# Patient Record
Sex: FEMALE | Race: WHITE
[De-identification: names, ages, dates, MRNs, and addresses within clinical notes are randomized per-mention and may not be internally consistent; named-entity substitution may affect disease eponyms.]

---

## 2019-05-29 NOTE — HP
Past Medical History





- Admission


History of Present Illness: 





30 yo  @ 40 5/7 wks by first trimester ultrasound, [facility] 2019





Pregnancy complicated by: 





1. 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


s/p Pelvic PT


s/p bladder infusions





2. Varicella non-immune





Patient presented overnight with chief complaint of contractions, she received 

stadol/phenergan overnight and was 2 cm dilated. 


She is for scheduled postdates induction of labor today. She reports fetal 

movement, denies leakage of fluid or vaginal bleeding. She reports contractions





History Source: Patient


Limitations to Obtaining History: No Limitations





- Past Medical History


Cardiovascular: No: HTN


Pulmonary: No: Asthma


Gastrointestinal: No: GERD


Renal/: Yes: Other (IC as above)


...: 1


...Para: 0


...Term: 0


...: 0


...Spon : 0


...Induced : 0


...Multiple Gestation: 0


...LMP: 18


... Weeks Gestation by Dates: 40.5


...EDC by Dates: 19


...EDC by Sono: 19


Heme/Onc: No: Anemia





- Past Surgical History


Hx Myomectomy: No


Hx Transabdominal Cerclage: No


Additional Surgical History:  - Appendectomy.   - Breast augmentation.  

 - Cystoscopy





- Smoking History


Smoking history: Never smoked


Have you smoked in the past 12 months: No


Aproximately how many cigarettes per day: 0





- Alcohol/Substance Use


Hx Alcohol Use: No





- Social History


Usual Living Arrangement: Yes: With Spouse


History of Recent Travel: No





Home Medications





- Allergies


Allergies/Adverse Reactions: 


 Allergies











Allergy/AdvReac Type Severity Reaction Status Date / Time


 


ciprofloxacin [From Cipro] Allergy Severe Rash Verified 19 18:29


 


ciprofloxacin HCl Allergy Severe Rash Verified 19 18:29





[From Cipro]     


 


Penicillins Allergy Severe Rash Verified 19 18:29


 


adhesive tape Allergy Mild Rash Verified 19 18:29


 


hazelnut Allergy Unknown  Verified 19 18:29














- Home Medications


Home Medications: 


Ambulatory Orders





Pnv 29-1 Tablet 1 tab PO DAILY 05/15/19 











Family Disease History





- Family Disease History


Family History: Denies





Review of Systems





- Review of Systems


Constitutional: reports: No Symptoms


Neck: reports: No Symptoms


Cardiovascular: reports: No Symptoms


Respiratory: reports: No Symptoms


Gastrointestinal: reports: No Symptoms


Genitourinary: reports: No Symptoms


Musculoskeletal: reports: No Symptoms


Hematology/Lymphatic: reports: No Symptoms





Physical Exam - Maternity


Vital Signs: 


 Vital Signs











Temperature  98.1 F   19 10:00


 


Pulse Rate  70   19 10:00


 


Respiratory Rate  20   19 10:00


 


Blood Pressure  130/82   19 10:00


 


O2 Sat by Pulse Oximetry (%)      











Constitutional: Yes: Well Nourished, No Distress, Calm


Cardiovascular: Yes: Regular Rate and Rhythm


Lungs: Clear to auscultation





- Abdominal Exam/OB


Number of Fetuses: Single


Fetal Presentation: Vertex


Contractions: Yes


Regularity: Irregular


Category: I


Accelerations: Non-Uniform


Decelerations: None





- Vaginal Exam/OB


Dilatation (cm): 2


Effacement (%): 90


Amniotic Membrane Status: Ruptured


Amniotic Fluid: Yes: Clear


Fetal Station: -3





- Physical Exam


Edema: Yes


Edema: LLE: Trace, RLE: Trace


Psychiatric: Yes: Alert, Oriented





- Labs


Lab Results: 


 CBC, BMP





 19 23:50 





 19 23:50 





PNL: A positive, antibody negative; RPR NR; HIV neg; HBS Ag neg; HCV neg; HG 

[place] AA; Parvo immune; GBS neg; GCT WNL; Varicella non-immune; Sequential WNL; 

Inheritest core neg 





Hemorrhage Risk Assessment





- Risk Factors


Medium Risk Factors: Yes: None


High Risk Factors: Yes: None


Risk Score: 1


Risk Level: Medium Risk





Assessment/Plan





30 yo Early labor, for augmentation


1. Admit to L&D


2. Routine labs collected and sent


3. GBS neg


4. AROM performed, clear fluid. Will monitor contractions; if they continue to 

be infrequent, will start pitocin


5. Will offer pain medication upon request


6. Will proceed with expectant management

## 2019-05-29 NOTE — PN
Ante-Partal Exam





- Subjective


Subjective: 





Patient reports increased pressure


No other complaints


Vital Signs: 


 Vital Signs











Temperature  99.4 F   05/29/19 20:00


 


Pulse Rate  88   05/29/19 20:30


 


Respiratory Rate  18   05/29/19 20:30


 


Blood Pressure  136/75   05/29/19 20:30


 


O2 Sat by Pulse Oximetry (%)  98   05/29/19 20:30











Bleeding: No


Headache: No


Visual changes: No


Right upper quadrant pain: No





- Contractions


Contractions: Yes


Regularity: Regular


Intensity: Unaware


Fetal Monitor Mode: External





- Exam during Labor


Fetal Heart Rate: 150


Variability: Moderate


Category: I


Fetal Monitor Accelerations: Present


Fetal Monitor Decelerations: None


Exam: Vaginal


Dilatation (cm): 6


Effacement (%): 100


Amniotic Membrane Status: Ruptured


Amniotic Fluid: Clear


Fetal Presentation: Vertex


Fetal Station: -1





- Intrapartum Hemorrhage Risk


Medium Risk Factors: None


High Risk Factors: None


Risk Score: 0


Risk Level: Low Risk





- Assessment/Plan


Assessment/Plan: 





30 yo Active labor


1. Good cervical change; Pitocin at 9


Will continue to monitor


2. Category I FHT


3. GBS negative


4. Will proceed with expectant management

## 2019-05-29 NOTE — PN
Ante-Partal Exam





- Subjective


Subjective: 





Patient comfortable s/p epidural 


Vital Signs: 


 Vital Signs











Temperature  98.1 F   05/29/19 14:53


 


Pulse Rate  83   05/29/19 13:00


 


Respiratory Rate  20   05/29/19 13:00


 


Blood Pressure  136/84   05/29/19 13:00


 


O2 Sat by Pulse Oximetry (%)      











Bleeding: No


Headache: No


Visual changes: No


Right upper quadrant pain: No





- Contractions


Contractions: Yes


Regularity: Regular


Intensity: Unaware


Fetal Monitor Mode: External





- Exam during Labor


Fetal Heart Rate: 150


Fetal Heart Rate Location: Midline


Category: I


Fetal Monitor Accelerations: Present


Fetal Monitor Decelerations: None


Exam: Vaginal


Dilatation (cm): 3


Effacement (%): 90


Amniotic Membrane Status: Ruptured


Fetal Presentation: Vertex


Fetal Station: -3





- Intrapartum Hemorrhage Risk


Medium Risk Factors: None


High Risk Factors: None


Risk Score: 0


Risk Level: Low Risk





- Assessment/Plan


Assessment/Plan: 





30 yo augmentation of labor


1. s/p AROM; on Pitocin


2. GBS neg


3. Pain well controlled with epidural


4. category I FHT


5. Will continue with expectant management

## 2019-05-30 NOTE — PN
Delivery





- Delivery


Vaginal Delivery: No Problems


Type of Anesthesia: Epidural


Episiotomy/Laceration: Left Mediolateral (second degree), Periurethral Extnsion/

lac (first degree)


EBL (cc): 400





Delivery, Single Birth





- Stages of Labor


Date 2nd Stage Initiated: 19


Time 2nd Stage Initiated: 04:25


Date of Delivery: 19


Time of Delivery: 05:36


Date Placenta Delivered: 19


Time Placenta Delivered: 06:00


Placenta: Yes: Spontaneous





- Condition of Infant


Pediatrician/Neonatologist Present: Yes


Name: Yudelka Ward


Infant Gender: Male


Position: Left, OA





- Apgar


  ** 1 Minute


Apgar Total Score: 9





- [place] Feeding Plan


Initial Plan: Elected not to breastfeed exclusively throughout hospitalization





Remarks





- Remarks


Remarks: 


Patient progressed to fully dilated and at 0536 via  delivered a viable 

Male infant in INOCENCIO position, APGARs 9,9.  Weight and length unknown at this 

time. 


Head delivered spontaneously, nuchal cord noted and delivered through, followed 

by shoulders and body without difficulty. 


Infant with spontaneous cry and placed on mother's abdomen.  


Nose and mouth was bulb suctioned. 


Cord was clamped and cut. 


Perineum and vagina examined, second degree L vaginal; bilateral periurethral 

and first degree vaginal lacerations were noted and repaired in the usual 

fashion. 


Rectal exam revealed no sutures in rectum. 


Placenta was delivered spontaneously and intact. 


20 units of pitocin in 1 L IVF was given. 


All counts correct x 2. 


Mother and infant stable in LDR. 


EBL 400cc.

## 2019-05-31 NOTE — PN
Progress Note (short form)





- Note


Progress Note: 





ppd 1 s/p  


doing well, ambulating, voids ok


 CBC, BMP





 19 23:50 





 19 23:50 





 Last Vital Signs











Temp Pulse Resp BP Pulse Ox


 


 97.7 F   65   20   123/79   98 


 


 19 06:00  19 06:00  19 06:00  19 06:00  19 09:00








abdomen soft , no distension , no cva 


uterus firm, non tender 


lochia mild 


no calf tenderness 


plan ambulate , cbc

## 2019-09-04 NOTE — DS
Discharge Information    IV Discontiued Time:  NA    Amount of Fluid Infused:  NA    Discharge Criteria = When patient returns to baseline or as per MD order    Consciousness:  Pt is fully awake    Circulation:  BP +/- 20% of pre-procedure level    Respiration:  Patient is able to breathe deeply    O2 Sat:  Patient is able to maintain O2 Sat >92% on room air    Activity:  Moves 4 extremities on command    Ambulation:  Patient is able to stand and walk or stand and pivot into wheelchair    Dressing:  Clean/dry or No Dressing    Notes:   Discharge instructions and AVS given to patient    Patient meets criteria for discharge?  YES    Admitted to PCU?  No    Responsible adult present to accompany patient home?  Yes    Signature/Title:    jing reynolds RN  RN Care Coordinator  [facility] [place]   Physical Exam-GYN


Vital Signs: 


 Vital Signs











Temperature  98.6 F   19 08:38


 


Pulse Rate  69   19 08:38


 


Respiratory Rate  20   19 08:38


 


Blood Pressure  114/64   19 08:38


 


O2 Sat by Pulse Oximetry (%)  98   19 09:00











Constitutional: Yes: Well Nourished, No Distress, Calm


Eyes: Yes: WNL, Conjunctiva Clear, EOM Intact


HENT: Yes: WNL, Atraumatic, Normocephalic


Neck: Yes: WNL, Supple, Trachea Midline


Cardiovascular: Yes: WNL, Regular Rate and Rhythm


Respiratory: Yes: WNL, Regular, CTA Bilaterally


Gastrointestinal: Yes: WNL


...Rectal Exam: Yes: WNL


Renal/: Yes: WNL


Pelvis: Yes: WNL


External Genitalia: Yes: Normal


....Post Partum: Yes: Uterus firm, Uterus non-tender, Slight lochia rubra


Breast(s): Yes: WNL


Musculoskeletal: Yes: WNL


Extremities: Yes: WNL


Edema: No


Integumentary: Yes: WNL


Neurological: Yes: WNL, Alert, Oriented


...Motor Strength: WNL


Psychiatric: Yes: WNL, Alert, Oriented


Labs: 


 CBC, BMP





 19 08:35 





 19 23:50 











Delivery





- Delivery


Vaginal Delivery: No Problems


Type of Anesthesia: Epidural


Episiotomy/Laceration: Left Mediolateral, Periurethral Extnsion/lac, 1st degree

, 2nd degree


EBL (cc): 400





Delivery, Single Birth





- Stages of Labor


Date 1st Stage Initiatied: 19


Time 1st Stage Initiated: 11:00


Date 2nd Stage Initiated: 19


Time 2nd Stage Initiated: 04:25


Date of Delivery: 19


Time of Delivery: 05:36


Time Placenta Delivered: 06:00


Placenta: Yes: Spontaneous





- Condition of Infant


Pediatrician/Neonatologist Present: Yes


Name: Yudelka Ward


Infant Gender: Male


Birth Weight: 8 lb 5 oz


Position: Left, OA


Total Hours ROM (Hrs/Mins): 17 hours 55 minutes





- Apgar


  ** 1 Minute


Apgar Total Score: 9





  ** 5 Minutes


Apgar Total Score: 9





- [place] Feeding Plan


Initial Plan: Elected not to breastfeed exclusively throughout hospitalization





Discharge Summary


Reason For Visit: LABOR


Procedures: Principal: 


Hospital Course: 





no complication


Condition: Good





- Instructions


Referrals: 


Jaime Soliz MD [Staff Physician] - 





- Home Medications


Comprehensive Discharge Medication List: 


Ambulatory Orders





Pnv 29-1 Tablet 1 tab PO DAILY 05/15/19

## 2019-11-04 ENCOUNTER — HOSPITAL ENCOUNTER (OUTPATIENT)
Dept: HOSPITAL 74 - JASU-SURG | Age: 29
Discharge: HOME | End: 2019-11-04
Attending: OBSTETRICS & GYNECOLOGY
Payer: COMMERCIAL

## 2019-11-04 VITALS — TEMPERATURE: 98.1 F

## 2019-11-04 VITALS — DIASTOLIC BLOOD PRESSURE: 58 MMHG | SYSTOLIC BLOOD PRESSURE: 98 MMHG | HEART RATE: 55 BPM

## 2019-11-04 VITALS — BODY MASS INDEX: 24.3 KG/M2

## 2019-11-04 DIAGNOSIS — O03.4: Primary | ICD-10-CM

## 2019-11-04 PROCEDURE — 10D17ZZ EXTRACTION OF PRODUCTS OF CONCEPTION, RETAINED, VIA NATURAL OR ARTIFICIAL OPENING: ICD-10-PCS | Performed by: OBSTETRICS & GYNECOLOGY

## 2019-11-04 NOTE — HP
Past Medical History





- Primary Care Physician


PCP:: Jaime Soliz





- Admission


Chief Complaint: 30yo P1 with incomplete Ab.


History of Present Illness: 





Pt was diagnosed with missed Ab on 10/31/2019 at [facility] (US) 8 2/7wk. She began 

bleeding and passed some clots last night. The repeat US showed retained POC. 

The pt declined medical and/or expectant management and requested D&C. 


History Source: Patient, Medical Record


Limitations to Obtaining History: No Limitations





- Past Medical History


CNS: No: Alzheimer's, CVA, Dementia, Migraine, Multiple Sclerosis, Peripheral 

Neuropathy, Parkinson's, Seizure, Syncope, TIA, Vertigo, Other


Cardiovascular: No: AFIB, Aneurysm, Aortic Insufficiency, Aortic Stenosis, CAD, 

CHF, Deep Vein Thrombosis, HTN, Hyperlipdemia, MI, Mitral Insufficiency, Mitral 

Stenosis, Murmur, Pulmonary Hypertension, Other


Pulmonary: No: Asthma, Bronchitis, Cancer, COPD, O2 Dependent, Pneumonia, 

Previously Intubated, Pulmonary Embolus, Pulmonary Fibrosis, Sleep Apnea, Other


Gastrointestinal: No: Ascites, Cancer, Constipation, Crohn's Disease, 

Diverticulitis, Diverticulosis, Esophageal Varices, Gastritis, GERD, GI Bleed, 

Hemorrhoids, Hiatal Hernia, Inflamatory Bowel Disease, Irritable Bowel Disease, 

Pancreatitis, Peptic Ulcer Disease, Ulcerative Colitis, Other


Hepatobiliary: No: Cirrhosis, Cholelithiasis, Cholecystitis, Choledocholithiasis

, Hepatitis A, Hepatitis B, Hepatitis C, Other


Renal/: Yes: Other (IC as above)


Reproductive: No: Ectopic Pregnancy, Endometriosis, Fibroids, PID, Polycystic 

Ovary Syndrome, Postmenopausal, Other


...: 2


...Para: 1


... Weeks Gestation by Dates: 10


Heme/Onc: No: Anemia, B12 Deficiency, Bleeding Disorder, Cancer, Current 

Chemotherapy, Current Radiation Therapy, Hemochromatosis, Hypercoaguable State, 

Myeloproliferative Synd, Sickle Cell Disease, Sickle Cell Trait, 

Thrombocytopenia, Other


Infectious Disease: No: AIDS, C-Diff, Herpes Zoster, HIV, MRSA, STD's, 

Tuberculosis, VREF, Other


Psych: No: Addictions, Anxiety, Bipolar, Depression, Panic, Psychosis, 

Schizophrenia, Other


Musculoskeletal: No: Bursitis, Chronic low back pain, Hemiparesis, Hemiplegia, 

Osteoarthritis, Paraplegia, Other


Rheumatology: No: Fibromyalgia, Gout, Lupus, Rheumatoid Arthritis, Sarcoidosis, 

Vasculitis, Other


ENT: No: Allergic Rhinitis, Sinusitis, Other


Endocrine: No: Kings's Disease, Cushing's Disease, Diabetes Insipidus, 

Diabetes Mellitus, Hyperparathyroidism, Hyperthyroidism, Hypothyroidism, 

Osteopenia, SIADH, Other


Dermatology: No: Basal Cell, Cellulitis, Eczema, Melanoma, Psoriasis, Squamous 

Cell, Other





- Past Surgical History


Past Surgical History: Yes: Appendectomy


Hx Myomectomy: No


Hx Transabdominal Cerclage: No


Additional Surgical History: Breast Augmentation, cystoscopy.





- Smoking History


Smoking history: Never smoked


Have you smoked in the past 12 months: No


Aproximately how many cigarettes per day: 0





- Alcohol/Substance Use


Hx Alcohol Use: No


History of Substance Use: reports: None





- Social History


Usual Living Arrangement: Yes: With Spouse, With Child


ADL: Independent


Occupation: Teacher


History of Recent Travel: No





Home Medications





- Allergies


Allergies/Adverse Reactions: 


 Allergies











Allergy/AdvReac Type Severity Reaction Status Date / Time


 


ciprofloxacin [From Cipro] Allergy Severe Rash Verified 19 16:42


 


ciprofloxacin HCl Allergy Severe Rash Verified 19 16:42





[From Cipro]     


 


Penicillins Allergy Severe Rash Verified 19 16:42


 


adhesive tape Allergy Mild Rash Verified 19 16:42


 


hazelnut Allergy Unknown  Verified 19 16:42














- Home Medications


Home Medications: 


Ambulatory Orders





Pnv No.95/Ferrous Fum/Folic AC [Prenatal Vitamin Tablet] 1 each PO DAILY  











Family Medical History


Family History: Unremarkable





Review of Systems





- Review of Systems


Constitutional: reports: No Symptoms


Eyes: reports: No Symptoms


HENT: reports: No Symptoms


Neck: reports: No Symptoms


Cardiovascular: reports: No Symptoms


Respiratory: reports: No Symptoms


Gastrointestinal: reports: No Symptoms


Genitourinary: reports: Vaginal Bleeding


Breasts: reports: No Symptoms Reported


Musculoskeletal: reports: No Symptoms


Integumentary: reports: No Symptoms


Neurological: reports: No Symptoms


Endocrine: reports: No Symptoms


Hematology/Lymphatic: reports: No Symptoms


Psychiatric: reports: No Symptoms


Pain Intensity: 2





Physical Exam-GYN


Vital Signs: 


 Vital Signs











Temperature  97.8 F   19 15:56


 


Pulse Rate  79   19 15:56


 


Respiratory Rate  16   19 15:56


 


Blood Pressure  118/76   19 15:56


 


O2 Sat by Pulse Oximetry (%)  99   19 15:56











Constitutional: Yes: Well Nourished, No Distress, Calm


Eyes: Yes: WNL, Conjunctiva Clear


HENT: Yes: WNL, Atraumatic, Normocephalic


Neck: Yes: WNL, Supple, Trachea Midline


Cardiovascular: Yes: WNL, Regular Rate and Rhythm


Respiratory: Yes: WNL, Regular, CTA Bilaterally


Gastrointestinal: Yes: WNL, Normal Bowel Sounds, Soft


...Rectal Exam: Yes: Deferred


Renal/: Yes: WNL


Pelvis: Yes: WNL


External Genitalia: Yes: Normal


Internal Exam Deferred: No


Vaginal Exam: Yes: Bleeding


Cervix: Yes: Other (os 1 cm open)


Uterus: Yes: Anteverted


Adnexa: Normal: Left, Right


Musculoskeletal: Yes: WNL


Extremities: Yes: WNL


Edema: No


Integumentary: Yes: WNL


Neurological: Yes: WNL, Alert, Oriented


...Motor Strength: WNL


Psychiatric: Yes: WNL, Alert, Oriented





Imaging





- Results


Ultrasound: Report Reviewed





Assessment/Plan





30yo P1 with incomplete Ab. We had a long discussion about the risks, benefits, 

and alternatives of surgery. I explained the risks of infection, bleeding, 

scarring, amenorrhea, Asherman's syndrome, infertility, perforation, need for 

additional surgery to treat any complications, etc. The pt declined expectant 

management of missed ab or prostaglandin indx. She requested to proceed with 

surgery. I emphasized that all surgeries have risks and no guarantees can be 

provided.

## 2019-11-04 NOTE — OP
Operative Note





- Note:


Operative Date: 11/04/19


Pre-Operative Diagnosis: Incomplete Ab


Operation: Suction, D&C


Findings: 





Cervical os 1 cm open, active bleeding from os, POC noted on suction curettage. 

No RPOC at end of procedure.


Post-Operative Diagnosis: Same as Pre-op


Surgeon: Jaime Soliz


Anesthesiologist/CRNA: Saji Garcia


Anesthesia: General


Specimens Removed: POC


Estimated Blood Loss (mls): 30


Blood Volume Replaced (mls): 0


Fluid Volume Replaced (mls): 600


Operative Report Dictated: Yes

## 2019-11-05 NOTE — OP
DATE OF OPERATION:  2019

 

PREOPERATIVE DIAGNOSIS:  Incomplete .

 

POSTOPERATIVE DIAGNOSIS:  Incomplete .

 

PROCEDURE:  Suction dilation and curettage, surgical management of incomplete

.

 

SURGEON:  Ericka Reyna MD

 

COMPLICATIONS:  None. 

 

ANESTHESIA:  General.  

 

ANESTHESIOLOGIST:  Saji Garcia MD

 

PATHOLOGY:  Products of conception.  

 

INTRAVENOUS FLUIDS:  600 mL. 

 

ESTIMATED BLOOD LOSS:  30 mL.

 

FINDINGS:  Examination under anesthesia revealed a dilated cervical os to 1 cm,

active vaginal bleeding was noted.  Products of conception were noted on suction

curettage.  No retained products of conception were noted at the end of the

procedure.  

 

PROCEDURE DESCRIPTION:  The patient was met preoperatively.  Risks, benefits, and

alternatives of surgery were discussed in detail.  All questions were answered.  The

consent form was reviewed and discussed.  The patient verbalized understanding and

requested to proceed with the surgery.  She was brought to the OR with IV running. 

The patient was placed on the surgical table in the supine position.  The general

anesthesia was achieved without difficulty.  The patient was then placed in a dorsal

lithotomy position using adjustable Benja stirrups.  She was examined under

anesthesia.  A small anteverted uterus was noted.  The cervical os was 1 cm dilated

with active bleeding.  The patient was then prepped and draped in the usual sterile

fashion.  A timeout was conducted as per standard protocol.  A sterile speculum was

introduced inside the patient's vagina.  The cervix was grasped with polyp forceps

and an 8-mm suction curette was introduced inside the uterine cavity.  A suction

curettage was performed and all of the products of conception were removed.  Once

this was completed, a sharp curette was used gently to assure no retained products of

conception inside the uterine cavity.  Once this was done, good hemostasis was noted.

 Sponge, lap, and instrument counts were correct.  The patient was returned to supine

position.  She was transferred to recovery room in stable condition and awake.  

 

 

ERICKA REYNA M.D.

 

[id number]

DD: 2019 00:12

DT: 2019 00:46

Job #:  [id number]

## 2019-11-06 NOTE — PATH
Surgical Pathology Report



Patient Name:  CHANCE GARZA

Accession #:  [id number]

[facility]. Rec. #:  [id number]                                                        

   /Age/Gender:  1990 (Age: 29) / F

Account:  [id number]                                                          

             Location: [facility]

Taken:  2019

Received:  2019

Reported:  2019

Physicians:  Jaime Soliz M.D.

  



Specimen(s) Received

 PRODUCTS OF CONCEPTION 





Clinical History

Incomplete 







Final Diagnosis

PRODUCTS OF CONCEPTION, DILATION AND CURETTINGS:

CHORIONIC VILLI PRESENT, CONSISTENT WITH PRODUCTS OF CONCEPTION.





***Electronically Signed***

Simin Hinds M.D.





Gross Description

Received in formalin labeled "products of conception," is a 7.5 x 5.0 x 0.8 cm

aggregate of tan-brown soft tissue fragments. No definitive villous tissue or

fetal somatic tissue is identified. A representative portion is submitted in 3

cassettes.

/2019



saudi

## 2020-09-28 ENCOUNTER — HOSPITAL ENCOUNTER (INPATIENT)
Dept: HOSPITAL 74 - JLDR | Age: 30
LOS: 3 days | Discharge: HOME | End: 2020-10-01
Attending: OBSTETRICS & GYNECOLOGY | Admitting: OBSTETRICS & GYNECOLOGY
Payer: COMMERCIAL

## 2020-09-28 VITALS — BODY MASS INDEX: 28.8 KG/M2

## 2020-09-28 DIAGNOSIS — O44.13: Primary | ICD-10-CM

## 2020-09-28 DIAGNOSIS — Z3A.38: ICD-10-CM

## 2020-09-28 LAB
ANION GAP SERPL CALC-SCNC: 7 MMOL/L (ref 8–16)
BASE EXCESS BLDCOA CALC-SCNC: -4.4 MMOL/L (ref 0–2)
BASE EXCESS BLDCOA CALC-SCNC: -5 MMOL/L (ref 0–2)
BASOPHILS # BLD: 0.5 % (ref 0–2)
BUN SERPL-MCNC: 8.1 MG/DL (ref 7–18)
CALCIUM SERPL-MCNC: 8.4 MG/DL (ref 8.5–10.1)
CHLORIDE SERPL-SCNC: 107 MMOL/L (ref 98–107)
CO2 SERPL-SCNC: 25 MMOL/L (ref 21–32)
CREAT SERPL-MCNC: 0.6 MG/DL (ref 0.55–1.3)
DEPRECATED RDW RBC AUTO: 13.1 % (ref 11.6–15.6)
EOSINOPHIL # BLD: 0.1 % (ref 0–4.5)
GLUCOSE SERPL-MCNC: 77 MG/DL (ref 74–106)
HCO3 BLDCO-SCNC: 20.6 MMHG (ref 20–29)
HCO3 BLDCO-SCNC: 25.4 MMHG (ref 20–29)
HCT VFR BLD CALC: 42.4 % (ref 32.4–45.2)
HGB BLD-MCNC: 14.1 GM/DL (ref 10.7–15.3)
INR BLD: 0.89 (ref 0.83–1.09)
LYMPHOCYTES # BLD: 10.2 % (ref 8–40)
MCH RBC QN AUTO: 31.5 PG (ref 25.7–33.7)
MCHC RBC AUTO-ENTMCNC: 33.3 G/DL (ref 32–36)
MCV RBC: 94.5 FL (ref 80–96)
MONOCYTES # BLD AUTO: 3.4 % (ref 3.8–10.2)
NEUTROPHILS # BLD: 85.8 % (ref 42.8–82.8)
PCO2 BLDCO: 40.3 MMHG (ref 30–78)
PCO2 BLDCO: 66.3 MMHG (ref 30–78)
PH BLDCO: 7.2 [PH] (ref 7.14–7.44)
PH BLDCO: 7.33 [PH] (ref 7.14–7.44)
PLATELET # BLD AUTO: 229 K/MM3 (ref 134–434)
PMV BLD: 9.6 FL (ref 7.5–11.1)
POTASSIUM SERPLBLD-SCNC: 3.9 MMOL/L (ref 3.5–5.1)
PT PNL PPP: 10.5 SEC (ref 9.7–13)
RBC # BLD AUTO: 4.48 M/MM3 (ref 3.6–5.2)
SODIUM SERPL-SCNC: 139 MMOL/L (ref 136–145)
WBC # BLD AUTO: 16.6 K/MM3 (ref 4–10)

## 2020-09-28 RX ADMIN — ACETAMINOPHEN PRN MG: 10 INJECTION, SOLUTION INTRAVENOUS at 22:20

## 2020-09-28 NOTE — OP
Operative Note





- Note:


Operative Date: 20


Pre-Operative Diagnosis: pregnancy 37 weeks, placeta previa with hemorrhage


Operation: primary LST c/s


Findings: 





edematous low uterine , with mulyiple large blood vessles running up to upper 

mid body of uterus


baby girl rot , apgar 9/9 , both tubes and ovaries normal


Post-Operative Diagnosis: Same as Pre-op


Surgeon: Nicko Tobin (Dr hunter)


Anesthesiologist/CRNA: Pratik Hannon


Anesthesia: Spinal


Specimens Removed: placenta


Estimated Blood Loss (mls): 1,000


Drains & Tubes with Location: godfrey, clear urine


Drains, Volume Out (mls): 400


Blood Volume Replaced (mls): 0


Operative Report Dictated: Yes

## 2020-09-28 NOTE — HP
Past Medical History





- Primary Care Physician


PCP:: Nicko Tobin





- Admission


Chief Complaint: pregnancy 38 weeks, placeta previa with hemorrhage


History of Present Illness: 





31 yo f , 38.1 weeks c/o of vaginal bleeding after a fall this afternoo

n, has low abdominal cramps, no contraction, fhr cat 1 , moderate red vaginal 

bleeding noted, advised c/s


History Source: Patient


Limitations to Obtaining History: No Limitations





- Past Medical History


Renal/: Yes: Other (IC as above)


...: 3


...Para: 1


...Term: 1


...: 0


...Spon : 1


...Induced : 0


...Living Children: 1


...Multiple Gestation: 0


...LMP: 20


... Weeks Gestation by Dates: 38.1


...EDC by Dates: 10/11/20


...EDC by Sono: 10/14/20





- Past Surgical History


Past Surgical History: Yes: Appendectomy


Hx Myomectomy: No


Hx Transabdominal Cerclage: No





- Smoking History


Smoking history: Never smoked


Have you smoked in the past 12 months: No


Aproximately how many cigarettes per day: 0





- Alcohol/Substance Use


Hx Alcohol Use: No


History of Substance Use: reports: None





- Social History


Usual Living Arrangement: Yes: With Spouse


ADL: Independent


Occupation: Teacher


History of Recent Travel: No





Home Medications





- Allergies


Allergies/Adverse Reactions: 


                                    Allergies











Allergy/AdvReac Type Severity Reaction Status Date / Time


 


ciprofloxacin [From Cipro] Allergy Severe Rash Verified 20 18:32


 


ciprofloxacin HCl Allergy Severe Rash Verified 20 18:32





[From Cipro]     


 


Penicillins Allergy Severe Rash Verified 20 18:32


 


adhesive tape Allergy Mild Rash Verified 20 18:32


 


hazelnut Allergy Unknown  Verified 20 18:32














- Home Medications


Home Medications: 


Ambulatory Orders





Pnv No.95/Ferrous Fum/Folic AC [Prenatal Vitamin Tablet] 1 each PO DAILY 

19 











Review of Systems





- Review of Systems


Constitutional: reports: No Symptoms


Eyes: reports: No Symptoms


HENT: reports: No Symptoms


Neck: reports: No Symptoms


Cardiovascular: reports: No Symptoms


Respiratory: reports: No Symptoms


Gastrointestinal: reports: No Symptoms


Genitourinary: reports: No Symptoms


Breasts: reports: No Symptoms Reported


Musculoskeletal: reports: No Symptoms


Integumentary: reports: No Symptoms


Neurological: reports: No Symptoms


Endocrine: reports: No Symptoms


Hematology/Lymphatic: reports: No Symptoms


Psychiatric: reports: No Symptoms





Physical Exam - Maternity


Vital Signs: 


                                   Vital Signs











Temperature  98.3 F   20 18:34


 


Pulse Rate  99 H  20 18:34


 


Respiratory Rate  20 18:34


 


Blood Pressure  124/92   20 18:34


 


O2 Sat by Pulse Oximetry (%)      











Constitutional: Yes: Well Nourished, No Distress, Calm


Eyes: Yes: WNL, Conjunctiva Clear, EOM Intact


HENT: Yes: WNL, Atraumatic, Normocephalic


Neck: Yes: WNL, Supple, Trachea Midline


Cardiovascular: Yes: WNL, Regular Rate and Rhythm


Breast(s): Yes: WNL





- Abdominal Exam/OB


Fundal Height: 38


Number of Fetuses: Single


Fetal Presentation: Vertex


Contractions: No


Regularity: Irritability


Intensity: Unaware


Fetal Heart Rate Location: Midline


Category: I


Accelerations: Non-Uniform


Decelerations: None





- Vaginal Exam/OB


Vaginal Bleeding: Yes, Moderate


Speculum Exam: Yes


Amniotic Membrane Status: Intact


Fetal Presentation: Vertex/Position





- Physical Exam


Musculoskeletal: Yes: WNL


Extremities: Yes: WNL


Edema: Yes


Edema: LLE: Trace, RLE: Trace


Deep Tendon Reflex Grade: Normal +2


...Motor Strength: WNL


Psychiatric: Yes: WNL





Hemorrhage Risk Assessment





- Risk Factors


High Risk Factors: Yes: Placenta previa, low lying


Risk Score: 2


Risk Level: High Risk





Problem List





- Problems


(1) Pregnancy with 38 completed weeks gestation


Code(s): Z3A.38 - 38 WEEKS GESTATION OF PREGNANCY   





(2) Placenta previa


Code(s): O44.00 - COMPLETE PLACENTA PREVIA NOS OR WITHOUT HEMOR, UNSP TRI   


Qualifiers: 


   Trimester: third trimester   Qualified Code(s): O44.03 - Complete placenta 

previa NOS or without hemorrhage, third trimester   





(3) Hemorrhage


Code(s): R58 - HEMORRHAGE, NOT ELSEWHERE CLASSIFIED   





Assessment/Plan





admit for c/s


type and cross 


risks associated with c/s and placenta previa , hemorrhage and post op 

complication discussed

## 2020-09-29 LAB
APTT BLD: 24.7 SECONDS (ref 25.2–36.5)
BASOPHILS # BLD: 0.2 % (ref 0–2)
DEPRECATED RDW RBC AUTO: 12.8 % (ref 11.6–15.6)
EOSINOPHIL # BLD: 0.3 % (ref 0–4.5)
HCT VFR BLD CALC: 34.1 % (ref 32.4–45.2)
HGB BLD-MCNC: 11.5 GM/DL (ref 10.7–15.3)
INR BLD: 0.96 (ref 0.83–1.09)
LYMPHOCYTES # BLD: 10.9 % (ref 8–40)
MCH RBC QN AUTO: 31.9 PG (ref 25.7–33.7)
MCHC RBC AUTO-ENTMCNC: 33.9 G/DL (ref 32–36)
MCV RBC: 94.1 FL (ref 80–96)
MONOCYTES # BLD AUTO: 7.6 % (ref 3.8–10.2)
NEUTROPHILS # BLD: 81 % (ref 42.8–82.8)
PLATELET # BLD AUTO: 163 K/MM3 (ref 134–434)
PMV BLD: 9.1 FL (ref 7.5–11.1)
PT PNL PPP: 11.3 SEC (ref 9.7–13)
RBC # BLD AUTO: 3.62 M/MM3 (ref 3.6–5.2)
WBC # BLD AUTO: 14.2 K/MM3 (ref 4–10)

## 2020-09-29 RX ADMIN — IBUPROFEN PRN MG: 600 TABLET, FILM COATED ORAL at 18:42

## 2020-09-29 RX ADMIN — GENTAMICIN SULFATE SCH MLS/HR: 80 INJECTION, SOLUTION INTRAVENOUS at 17:25

## 2020-09-29 RX ADMIN — GENTAMICIN SULFATE SCH MLS/HR: 80 INJECTION, SOLUTION INTRAVENOUS at 02:30

## 2020-09-29 RX ADMIN — GENTAMICIN SULFATE SCH MLS/HR: 80 INJECTION, SOLUTION INTRAVENOUS at 10:12

## 2020-09-29 RX ADMIN — ENOXAPARIN SODIUM SCH MG: 40 INJECTION SUBCUTANEOUS at 10:12

## 2020-09-29 RX ADMIN — ACETAMINOPHEN PRN MG: 10 INJECTION, SOLUTION INTRAVENOUS at 12:23

## 2020-09-29 RX ADMIN — ACETAMINOPHEN PRN MG: 10 INJECTION, SOLUTION INTRAVENOUS at 06:06

## 2020-09-29 RX ADMIN — ACETAMINOPHEN PRN MG: 325 TABLET ORAL at 18:42

## 2020-09-29 RX ADMIN — SIMETHICONE CHEW TAB 80 MG PRN MG: 80 TABLET ORAL at 18:42

## 2020-09-29 RX ADMIN — SIMETHICONE CHEW TAB 80 MG PRN MG: 80 TABLET ORAL at 10:11

## 2020-09-29 RX ADMIN — GENTAMICIN SULFATE SCH MLS/HR: 80 INJECTION, SOLUTION INTRAVENOUS at 00:15

## 2020-09-29 NOTE — PN
Progress Note (short form)





- Note


Progress Note: 





Anesthesia postop note





31 y/o F s/p spinal/duramorph for  section


POD#1, vss, aaox3, pain well controlled, sensory motor intact distally


No anesthesia complications.

## 2020-09-29 NOTE — PN
Progress Note (short form)





- Note


Progress Note: 





pod 1 s/p c/s for placenta previa,


doing well, ambulating, voids ok, no excess vaginal bleeding 


                                    CBC, BMP





                                 09/29/20 06:45 





                                 09/28/20 21:45 





                                Last Vital Signs











Temp Pulse Resp BP Pulse Ox


 


 99.2 F   101 H  18   111/80   100 


 


 09/29/20 17:42  09/29/20 17:42  09/29/20 18:00  09/29/20 17:42  09/29/20 17:42








abdomen soft, no distension, no cva 


incision dry, clean 


lochia mild


no calf tenderness


plan ambulate , advance diet 


monitor VS 


DVT prophylaxis





Problem List





- Problems


(1) Pregnancy with 38 completed weeks gestation


Code(s): Z3A.38 - 38 WEEKS GESTATION OF PREGNANCY   





(2) Placenta previa


Code(s): O44.00 - COMPLETE PLACENTA PREVIA NOS OR WITHOUT HEMOR, UNSP TRI   


Qualifiers: 


   Trimester: third trimester   Qualified Code(s): O44.03 - Complete placenta 

previa NOS or without hemorrhage, third trimester   





(3) Hemorrhage


Code(s): R58 - HEMORRHAGE, NOT ELSEWHERE CLASSIFIED

## 2020-09-30 RX ADMIN — ENOXAPARIN SODIUM SCH MG: 40 INJECTION SUBCUTANEOUS at 10:25

## 2020-09-30 RX ADMIN — IBUPROFEN PRN MG: 600 TABLET, FILM COATED ORAL at 18:43

## 2020-09-30 RX ADMIN — ACETAMINOPHEN PRN MG: 325 TABLET ORAL at 18:44

## 2020-09-30 RX ADMIN — ACETAMINOPHEN PRN MG: 325 TABLET ORAL at 06:00

## 2020-09-30 RX ADMIN — ACETAMINOPHEN PRN MG: 325 TABLET ORAL at 22:51

## 2020-09-30 RX ADMIN — GENTAMICIN SULFATE SCH MLS/HR: 80 INJECTION, SOLUTION INTRAVENOUS at 01:43

## 2020-09-30 RX ADMIN — ACETAMINOPHEN PRN MG: 325 TABLET ORAL at 02:00

## 2020-09-30 RX ADMIN — IBUPROFEN PRN MG: 600 TABLET, FILM COATED ORAL at 10:45

## 2020-09-30 RX ADMIN — IBUPROFEN PRN MG: 600 TABLET, FILM COATED ORAL at 06:01

## 2020-09-30 RX ADMIN — ACETAMINOPHEN PRN MG: 325 TABLET ORAL at 15:09

## 2020-09-30 RX ADMIN — IBUPROFEN PRN MG: 600 TABLET, FILM COATED ORAL at 15:11

## 2020-09-30 RX ADMIN — SIMETHICONE CHEW TAB 80 MG PRN MG: 80 TABLET ORAL at 02:00

## 2020-09-30 RX ADMIN — ACETAMINOPHEN PRN MG: 325 TABLET ORAL at 10:46

## 2020-09-30 RX ADMIN — IBUPROFEN PRN MG: 600 TABLET, FILM COATED ORAL at 02:00

## 2020-09-30 RX ADMIN — SIMETHICONE CHEW TAB 80 MG PRN MG: 80 TABLET ORAL at 06:00

## 2020-09-30 RX ADMIN — SIMETHICONE CHEW TAB 80 MG PRN MG: 80 TABLET ORAL at 18:43

## 2020-09-30 RX ADMIN — IBUPROFEN PRN MG: 600 TABLET, FILM COATED ORAL at 22:52

## 2020-09-30 RX ADMIN — SIMETHICONE CHEW TAB 80 MG PRN MG: 80 TABLET ORAL at 22:52

## 2020-09-30 RX ADMIN — SIMETHICONE CHEW TAB 80 MG PRN MG: 80 TABLET ORAL at 10:46

## 2020-09-30 RX ADMIN — SIMETHICONE CHEW TAB 80 MG PRN MG: 80 TABLET ORAL at 15:11

## 2020-09-30 NOTE — PN
Post Partum Progress Note





- Subjective


Subjective: 





Patient without acute complaints. 


Reports tolerating oral intake without nausea or vomiting. 


Ambulating without dizziness. 


Denies fevers or chills.


Pain well controlled with oral pain medication.


Pumping/breast feeding without issue.


Post Partum Day: 2


Type of Delivery: Primary C/S


Vital Signs: 


                                   Vital Signs











Temperature  98.1 F   09/29/20 22:00


 


Pulse Rate  69   09/29/20 22:00


 


Respiratory Rate  20   09/29/20 23:00


 


Blood Pressure  112/71   09/29/20 22:00


 


O2 Sat by Pulse Oximetry (%)  95   09/29/20 22:00











Breast Exam: Yes: Soft


Uterus: Yes: Fundus Firm, Fundus below umbilicus, Non-tender


Incision: Yes: Sutures intact


Abdomen/GI: Yes: Abdomen soft, Passing flatus, Tolerating PO


Lochia: Yes: Rubra


Extremities: Yes: Calves non-tender


Perineum: Yes: Intact


Activity: Ambulating





- Labs


Labs: 


                                       CBC











WBC  14.2 K/mm3 (4.0-10.0)  H  09/29/20  06:45    


 


RBC  3.62 M/mm3 (3.60-5.2)   09/29/20  06:45    


 


Hgb  11.5 GM/dL (10.7-15.3)   09/29/20  06:45    


 


Hct  34.1 % (32.4-45.2)  D 09/29/20  06:45    


 


MCV  94.1 fl (80-96)   09/29/20  06:45    


 


MCH  31.9 pg (25.7-33.7)   09/29/20  06:45    


 


MCHC  33.9 g/dl (32.0-36.0)   09/29/20  06:45    


 


RDW  12.8 % (11.6-15.6)   09/29/20  06:45    


 


Plt Count  163 K/MM3 (134-434)  D 09/29/20  06:45    


 


MPV  9.1 fl (7.5-11.1)   09/29/20  06:45    


 


Absolute Neuts (auto)  11.5 K/mm3 (1.5-8.0)  H  09/29/20  06:45    


 


Neutrophils %  81.0 % (42.8-82.8)   09/29/20  06:45    


 


Lymphocytes %  10.9 % (8-40)   09/29/20  06:45    


 


Monocytes %  7.6 % (3.8-10.2)  D 09/29/20  06:45    


 


Eosinophils %  0.3 % (0-4.5)  D 09/29/20  06:45    


 


Basophils %  0.2 % (0-2.0)   09/29/20  06:45    


 


Nucleated RBC %  0 % (0-0)   09/29/20  06:45    














Assessment/Plan





  30y.o. female s/p primary LT C/S, doing well stable, afebrile.


The pt is asymptomatic for s/sxs of anemia.


Postpartum care instructions reviewed.


Postoperative care and instructions reviewed.


Continue routine postop care.


Ambulation encouraged.

## 2020-09-30 NOTE — DS
Physical Exam-GYN


Vital Signs: 


                                   Vital Signs











Temperature  98.4 F   20 10:00


 


Pulse Rate  72   20 10:00


 


Respiratory Rate  20 10:00


 


Blood Pressure  111/78   20 10:00


 


O2 Sat by Pulse Oximetry (%)  95   20 22:00











Constitutional: Yes: Well Nourished, No Distress, Calm


Eyes: Yes: WNL, Conjunctiva Clear, EOM Intact


HENT: Yes: WNL, Atraumatic, Normocephalic


Neck: Yes: WNL, Supple, Trachea Midline


Cardiovascular: Yes: WNL, Regular Rate and Rhythm


Respiratory: Yes: WNL, Regular, CTA Bilaterally


Gastrointestinal: Yes: WNL, Normal Bowel Sounds, Soft


...Rectal Exam: Yes: Deferred


Renal/: Yes: WNL


Internal Exam Deferred: Yes


....Post Partum: Yes: Uterus firm, Uterus non-tender, Slight lochia rubra


Breast(s): Yes: WNL


Musculoskeletal: Yes: WNL


Extremities: Yes: WNL


Edema: No


Integumentary: Yes: WNL


Wound/Incision: Yes: Clean/Dry, Well Approximated, Steri Strips, Open to air


Neurological: Yes: WNL, Alert, Oriented


...Motor Strength: WNL


Psychiatric: Yes: WNL, Alert, Oriented


Labs: 


                                    CBC, BMP





                                 20 06:45 





                                 20 21:45 











Delivery





- Delivery


 Section: Primary, Low Flap Transverse


Type of Anesthesia: Spinal


Episiotomy/Laceration: None


EBL (cc): 1,000





Delivery, Single Birth





- Stages of Labor


Date of Delivery: 20


Time of Delivery: 19:18


Time Placenta Delivered: 19:19


Placenta: Yes: Expressed, Manual Removal





- Condition of Infant


Pediatrician/Neonatologist Present: Yes


Name: Marilyn Prieto


Infant Gender: Male


Birth Weight: 3.147 kg


Total Hours ROM (Hrs/Mins): 1 min





- Apgar


  ** 1 Minute


Apgar Total Score: 9





  ** 5 Minutes


Apgar Total Score: 9





-  Feeding Plan


Initial Plan: Exclusive breastfeeding throughout hospitalization


Benefits of Breastfeeding Exclusively reinforced: Yes





Discharge Summary


Problems reviewed: Yes


Reason For Visit: C SECTION


Current Active Problems





Hemorrhage (Acute)


Placenta previa (Acute)


Pregnancy with 38 completed weeks gestation (Acute)








Procedures: Principal: Primary LT C/S


Hospital Course: 


Normal postop and postpartum recovery


Plan of Treatment: 


Normal postop and postpartum recovery


Goals: 


Normal postop and postpartum recovery


Condition: Good





- Instructions


Diet, Activity, Other Instructions: 





Physical activity





Resume your normal everyday activity as tolerated no heavy lifting or exercise 

until seen by your surgeon. You may walk unlimited margaret of and climb stairs. 

You may resume driving the car when you feel safe and comfortable behind the 

wheel. No sexual activity as instructed.





Wound care


If you have a bandage, leave it on, and keep dry for 48-72 hours. After that 

time discard the outer bandage. If they are tapes on the skin under the out of 

bandage leave them in place. They will peel off in the next 7 to 10 days. Do Not

Peel them off. You may shower the day after surgery. If there are tapes present 

on the skin, you may shower over them.





Diet


There are no dietary restrictions. Eat healthy, high-fiber foods. Drink 6 to 8 

glasses of liquid each day. This will assist in keeping your bowels are regular.





Pain management


You may take Tylenol or acetaminophen or Ibuprofen (for example, Motrin, Advil 

etc.) from my pain prescription medication is ordered should be taken as 

prescribed for moderate to severe pain.





Call MD for any of the following:





Severe pain not relieved by medication


Fever of 101 or higher


Excessive bleeding or drainage on dressing


Inability to urinate

















Referrals: 


Jaime Soliz MD [Staff Physician] - 1 Week


Disposition: HOME





- Home Medications


Comprehensive Discharge Medication List: 


Ambulatory Orders





Pnv No.95/Ferrous Fum/Folic AC [Prenatal Vitamin Tablet] 1 each PO DAILY 

19 


Ibuprofen 600 mg PO Q6H PRN #30 tablet 20 


Oxycodone HCl/Acetaminophen [Percocet 5-325 mg Tablet -] 1 - 2 tab PO Q6H PRN 

#20 tab MDD 8 20 








Prescription Drug Monitoring Program (I-STOP) results: I-STOP not reviewed

## 2020-10-01 VITALS — TEMPERATURE: 98.4 F | HEART RATE: 76 BPM | DIASTOLIC BLOOD PRESSURE: 72 MMHG | SYSTOLIC BLOOD PRESSURE: 126 MMHG

## 2020-10-01 LAB
BASOPHILS # BLD: 0.3 % (ref 0–2)
DEPRECATED RDW RBC AUTO: 13.6 % (ref 11.6–15.6)
EOSINOPHIL # BLD: 1.2 % (ref 0–4.5)
HCT VFR BLD CALC: 28.9 % (ref 32.4–45.2)
HGB BLD-MCNC: 10.2 GM/DL (ref 10.7–15.3)
LYMPHOCYTES # BLD: 14.7 % (ref 8–40)
MCH RBC QN AUTO: 33.4 PG (ref 25.7–33.7)
MCHC RBC AUTO-ENTMCNC: 35.2 G/DL (ref 32–36)
MCV RBC: 94.9 FL (ref 80–96)
MONOCYTES # BLD AUTO: 8.9 % (ref 3.8–10.2)
NEUTROPHILS # BLD: 74.9 % (ref 42.8–82.8)
PLATELET # BLD AUTO: 158 K/MM3 (ref 134–434)
PMV BLD: 9.1 FL (ref 7.5–11.1)
RBC # BLD AUTO: 3.05 M/MM3 (ref 3.6–5.2)
WBC # BLD AUTO: 10.5 K/MM3 (ref 4–10)

## 2020-10-01 RX ADMIN — IBUPROFEN PRN MG: 600 TABLET, FILM COATED ORAL at 03:01

## 2020-10-01 RX ADMIN — SIMETHICONE CHEW TAB 80 MG PRN MG: 80 TABLET ORAL at 03:02

## 2020-10-01 RX ADMIN — IBUPROFEN PRN MG: 600 TABLET, FILM COATED ORAL at 09:05

## 2020-10-01 RX ADMIN — ENOXAPARIN SODIUM SCH MG: 40 INJECTION SUBCUTANEOUS at 10:00

## 2020-10-01 RX ADMIN — SIMETHICONE CHEW TAB 80 MG PRN MG: 80 TABLET ORAL at 09:05

## 2020-10-01 RX ADMIN — ACETAMINOPHEN PRN MG: 325 TABLET ORAL at 09:06

## 2020-10-01 RX ADMIN — ACETAMINOPHEN PRN MG: 325 TABLET ORAL at 03:01

## 2020-10-01 NOTE — PN
Post Partum Progress Note





- Subjective


Subjective: 


Patient without acute complaints. 


Reports tolerating oral intake without nausea or vomiting. 


Ambulating without dizziness. 


Denies fevers or chills.


Pain well controlled with oral pain medication.


Breastfeeding without difficulty.


Passing flatus.





Post Partum Day: 3


Type of Delivery: Repeat C/S


Vital Signs: 


                                   Vital Signs











Temperature  98.9 F   09/30/20 22:00


 


Pulse Rate  75   09/30/20 22:00


 


Respiratory Rate  20   09/30/20 22:00


 


Blood Pressure  124/72   09/30/20 22:00


 


O2 Sat by Pulse Oximetry (%)  97   09/30/20 21:00











Breast Exam: Yes: Soft


Uterus: Yes: Fundus Firm, Fundus below umbilicus


Abdomen/GI: Yes: Abdomen soft, Passing flatus, Tolerating PO.  No: Abdominal 

Distention, Tender


Lochia: Yes: Rubra


Lochia, amount: Small


Extremities: Yes: Calves non-tender.  No: Edema





- Labs


Labs: 


                                       CBC











WBC  10.5 K/mm3 (4.0-10.0)  H  10/01/20  07:15    


 


RBC  3.05 M/mm3 (3.60-5.2)  L  10/01/20  07:15    


 


Hgb  10.2 GM/dL (10.7-15.3)  L  10/01/20  07:15    


 


Hct  28.9 % (32.4-45.2)  L D 10/01/20  07:15    


 


MCV  94.9 fl (80-96)   10/01/20  07:15    


 


MCH  33.4 pg (25.7-33.7)   10/01/20  07:15    


 


MCHC  35.2 g/dl (32.0-36.0)   10/01/20  07:15    


 


RDW  13.6 % (11.6-15.6)   10/01/20  07:15    


 


Plt Count  158 K/MM3 (134-434)   10/01/20  07:15    


 


MPV  9.1 fl (7.5-11.1)   10/01/20  07:15    


 


Absolute Neuts (auto)  7.9 K/mm3 (1.5-8.0)   10/01/20  07:15    


 


Neutrophils %  74.9 % (42.8-82.8)   10/01/20  07:15    


 


Lymphocytes %  14.7 % (8-40)  D 10/01/20  07:15    


 


Monocytes %  8.9 % (3.8-10.2)   10/01/20  07:15    


 


Eosinophils %  1.2 % (0-4.5)  D 10/01/20  07:15    


 


Basophils %  0.3 % (0-2.0)   10/01/20  07:15    


 


Nucleated RBC %  0 % (0-0)   10/01/20  07:15    














Assessment/Plan





29 yo POD # 3 s/p primary CD, afebrile, vital signs stable, mild asymtpoamtic 

anemia, doing well and stable for discharge home


1. Patient stable for discharge home today.


2. Patient encouraged to contact MD for:


- Severe pain not controlled by oral pain medication


- Fevers or chills


- Nausea or vomiting, intolerance of oral intake


- Incision redness, tenderness or discharge


3. Patient to follow up in office in 1-2 weeks for incision check, 4-6 weeks for

postpartum visit

## 2020-10-01 NOTE — OP
DATE OF OPERATION:  2020

 

PREOPERATIVE DIAGNOSIS:  Pregnancy at 37 weeks, placenta previa with hemorrhage.

 

POSTOPERATIVE DIAGNOSIS:  Pregnancy at 37 weeks, placenta previa with hemorrhage.

 

PROCEDURE:  Primary low segment transverse  section.

 

SURGEON:  Laura Tobin MD

 

FIRST ASSIST:  Jaime Soliz MD and Dr. Hoyos

 

ANESTHESIA:  Spinal.

 

ANESTHESIOLOGIST:  Praitk Hannon MD

 

ESTIMATED BLOOD LOSS:  1000 mL.

 

OPERATION:  The patient was taken to the operating room.  Under adequate spinal

anesthesia abdomen and perineum were prepped and draped.  Pfannenstiel abdominal

incision was made.  Abdominal wall was cut layer by layer until peritoneum was

exposed and incised.  Upon entering the abdominal cavity lower uterine segment was

identified and uterovesical fold of peritoneum established.  Bladder was pushed down.

 Then with the lower blade of the Keli retractor in the pelvis, a low transverse

uterine incision was made.  Incision extended laterally with bandage scissor and

placenta pushed away and the head delivered.  Nasopharynx was suctioned and a live

baby girl was delivered.  Apgar 9, 9.  Placenta was delivered manually.  Uterine

cavity was cleaned of all remaining tissue.  Uterine incision closed first with 0

Biosyn continuous suture, then the second layer with 0 Biosyn imbricating the first

layer.  There was some oozing from the right broad ligament which 2 ligature sutures

of 0 Biosyn were applied and the oozing still continued.  Dr. Soliz put a suture in

the right round ligament and hemostasis was established.  Then FloSeal was placed on

that round ligament.  No active bleeding was seen and then peritoneum was closed with

0 Biosyn continuous suture.  Muscles were brought together with interrupted suture of

0 Biosyn.  Fascia was closed with 0 Biosyn continuous suture, subcutaneous fat with

interrupted suture of 0 Vicryl and skin was closed with 3-0 Vicryl subcuticular

continuous suture.  Patient tolerated procedure well, left the OR in a good

condition.

 

 

LAURA TOBIN M.D. [id number]

DD: 2020 21:28

DT: 10/01/2020 09:22

Job #:  [id number]

## 2020-10-02 NOTE — PATH
Surgical Pathology Report



Patient Name:  CHANCE GARZA

Accession #:  [id number]

[facility]. Rec. #:  [id number]                                                        

   /Age/Gender:  1990 (Age: 30) / F

Account:  [id number]                                                          

             Location: [facility] OBS/GYN

Taken:  2020

Received:  2020

Reported:  10/2/2020

Physicians:  Nicko Tobin M.D.

  



Specimen(s) Received

 PLACENTA 





Clinical History

, 37.5 weeks placenta previa, status post fall on left side with acute

active bleeding







Final Diagnosis

PLACENTA,  SECTION:

452 G THIRD TRIMESTER PLACENTA WITH TRIVASCULAR UMBILICAL CORD AND UNREMARKABLE

PLACENTAL MEMBRANES.





***Electronically Signed***

Angelika Dodd M.D.





Gross Description

The specimen is received fresh labeled placenta and is a 452 gram, 23.5 x 15.5 x

1.6 cm. placenta with attached membranes and umbilical cord.  The attached

membranes are tan, translucent with focal opacities and insert marginally.  The

umbilical cord measures 10 cm. in length and averages 1.2 cm. in diameter.  The

cord inserts eccentrically, 3 cm. to the nearest margin.  No true knots or

strictures are identified. Cut surface of the umbilical cord reveals 3 vessels.

The fetal surface is grey-blue with minimal fibrin deposition and appropriate

caliber vessels.  The maternal surface is red-brown with focal defects. 

Sectioning reveals red-brown, spongy parenchyma.  No lesions are identified. 

Representative sections are submitted in three cassettes as follows: 1- membrane

rolls and umbilical cord; 2-3- full thickness sections of placenta.

/10/1/2020



[facility]/10/1/2020

## 2021-03-08 ENCOUNTER — HOSPITAL ENCOUNTER (EMERGENCY)
Dept: HOSPITAL 74 - JVIRT | Age: 31
Discharge: HOME | End: 2021-03-08
Payer: COMMERCIAL

## 2021-03-08 DIAGNOSIS — Z11.52: Primary | ICD-10-CM

## 2021-03-08 PROCEDURE — U0003 INFECTIOUS AGENT DETECTION BY NUCLEIC ACID (DNA OR RNA); SEVERE ACUTE RESPIRATORY SYNDROME CORONAVIRUS 2 (SARS-COV-2) (CORONAVIRUS DISEASE [COVID-19]), AMPLIFIED PROBE TECHNIQUE, MAKING USE OF HIGH THROUGHPUT TECHNOLOGIES AS DESCRIBED BY CMS-2020-01-R: HCPCS

## 2021-03-08 PROCEDURE — C9803 HOPD COVID-19 SPEC COLLECT: HCPCS

## 2022-03-15 ENCOUNTER — NON-APPOINTMENT (OUTPATIENT)
Age: 32
End: 2022-03-15

## 2022-03-16 PROBLEM — Z00.00 ENCOUNTER FOR PREVENTIVE HEALTH EXAMINATION: Status: ACTIVE | Noted: 2022-03-16

## 2022-03-17 ENCOUNTER — APPOINTMENT (OUTPATIENT)
Dept: INTERNAL MEDICINE | Facility: CLINIC | Age: 32
End: 2022-03-17
Payer: COMMERCIAL

## 2022-03-17 VITALS
OXYGEN SATURATION: 96 % | WEIGHT: 150 LBS | BODY MASS INDEX: 23.54 KG/M2 | HEART RATE: 72 BPM | SYSTOLIC BLOOD PRESSURE: 108 MMHG | TEMPERATURE: 97.2 F | DIASTOLIC BLOOD PRESSURE: 78 MMHG | HEIGHT: 67 IN

## 2022-03-17 DIAGNOSIS — Z78.9 OTHER SPECIFIED HEALTH STATUS: ICD-10-CM

## 2022-03-17 DIAGNOSIS — Z80.42 FAMILY HISTORY OF MALIGNANT NEOPLASM OF PROSTATE: ICD-10-CM

## 2022-03-17 DIAGNOSIS — Z82.49 FAMILY HISTORY OF ISCHEMIC HEART DISEASE AND OTHER DISEASES OF THE CIRCULATORY SYSTEM: ICD-10-CM

## 2022-03-17 DIAGNOSIS — R59.0 LOCALIZED ENLARGED LYMPH NODES: ICD-10-CM

## 2022-03-17 DIAGNOSIS — G89.29 HEADACHE, UNSPECIFIED: ICD-10-CM

## 2022-03-17 DIAGNOSIS — Z83.49 FAMILY HISTORY OF OTHER ENDOCRINE, NUTRITIONAL AND METABOLIC DISEASES: ICD-10-CM

## 2022-03-17 DIAGNOSIS — N30.10 INTERSTITIAL CYSTITIS (CHRONIC) W/OUT HEMATURIA: ICD-10-CM

## 2022-03-17 DIAGNOSIS — Z00.00 ENCOUNTER FOR GENERAL ADULT MEDICAL EXAMINATION W/OUT ABNORMAL FINDINGS: ICD-10-CM

## 2022-03-17 DIAGNOSIS — R51.9 HEADACHE, UNSPECIFIED: ICD-10-CM

## 2022-03-17 PROCEDURE — G0442 ANNUAL ALCOHOL SCREEN 15 MIN: CPT | Mod: NC,59

## 2022-03-17 PROCEDURE — 99213 OFFICE O/P EST LOW 20 MIN: CPT | Mod: 25

## 2022-03-17 PROCEDURE — 99385 PREV VISIT NEW AGE 18-39: CPT | Mod: 25

## 2022-03-17 PROCEDURE — 36415 COLL VENOUS BLD VENIPUNCTURE: CPT

## 2022-03-17 RX ORDER — CLOMIPHENE CITRATE 50 MG/1
50 TABLET ORAL DAILY
Refills: 0 | Status: ACTIVE | COMMUNITY
Start: 2022-03-17

## 2022-03-23 PROBLEM — Z78.9 NEVER SMOKED TOBACCO: Status: ACTIVE | Noted: 2022-03-17

## 2022-03-23 PROBLEM — R51.9 CHRONIC HEADACHE: Status: ACTIVE | Noted: 2022-03-17

## 2022-03-24 ENCOUNTER — NON-APPOINTMENT (OUTPATIENT)
Age: 32
End: 2022-03-24

## 2022-03-24 DIAGNOSIS — N39.0 URINARY TRACT INFECTION, SITE NOT SPECIFIED: ICD-10-CM

## 2022-03-24 LAB
ALBUMIN SERPL ELPH-MCNC: 4.9 G/DL
ALP BLD-CCNC: 55 U/L
ALT SERPL-CCNC: 13 U/L
ANION GAP SERPL CALC-SCNC: 10 MMOL/L
APPEARANCE: CLEAR
AST SERPL-CCNC: 16 U/L
BACTERIA UR CULT: ABNORMAL
BACTERIA: NEGATIVE
BASOPHILS # BLD AUTO: 0.04 K/UL
BASOPHILS NFR BLD AUTO: 0.8 %
BILIRUB SERPL-MCNC: 0.4 MG/DL
BILIRUBIN URINE: NEGATIVE
BLOOD URINE: NEGATIVE
BUN SERPL-MCNC: 12 MG/DL
CALCIUM SERPL-MCNC: 9.9 MG/DL
CHLORIDE SERPL-SCNC: 108 MMOL/L
CHOLEST SERPL-MCNC: 170 MG/DL
CO2 SERPL-SCNC: 23 MMOL/L
COLOR: NORMAL
CREAT SERPL-MCNC: 0.71 MG/DL
EGFR: 116 ML/MIN/1.73M2
EOSINOPHIL # BLD AUTO: 0.09 K/UL
EOSINOPHIL NFR BLD AUTO: 1.7 %
GLUCOSE QUALITATIVE U: NEGATIVE
GLUCOSE SERPL-MCNC: 97 MG/DL
HCT VFR BLD CALC: 39.6 %
HDLC SERPL-MCNC: 61 MG/DL
HGB BLD-MCNC: 12.8 G/DL
HYALINE CASTS: 0 /LPF
IMM GRANULOCYTES NFR BLD AUTO: 0.2 %
KETONES URINE: NEGATIVE
LDLC SERPL CALC-MCNC: 93 MG/DL
LEUKOCYTE ESTERASE URINE: NEGATIVE
LYMPHOCYTES # BLD AUTO: 1.78 K/UL
LYMPHOCYTES NFR BLD AUTO: 33.8 %
MAN DIFF?: NORMAL
MCHC RBC-ENTMCNC: 31.3 PG
MCHC RBC-ENTMCNC: 32.3 GM/DL
MCV RBC AUTO: 96.8 FL
MICROSCOPIC-UA: NORMAL
MONOCYTES # BLD AUTO: 0.71 K/UL
MONOCYTES NFR BLD AUTO: 13.5 %
NEUTROPHILS # BLD AUTO: 2.64 K/UL
NEUTROPHILS NFR BLD AUTO: 50 %
NITRITE URINE: POSITIVE
NONHDLC SERPL-MCNC: 109 MG/DL
PH URINE: 6.5
PLATELET # BLD AUTO: 237 K/UL
POTASSIUM SERPL-SCNC: 4.6 MMOL/L
PROT SERPL-MCNC: 7.5 G/DL
PROTEIN URINE: NEGATIVE
RBC # BLD: 4.09 M/UL
RBC # FLD: 12.5 %
RED BLOOD CELLS URINE: 1 /HPF
SODIUM SERPL-SCNC: 141 MMOL/L
SPECIFIC GRAVITY URINE: 1
SQUAMOUS EPITHELIAL CELLS: 1 /HPF
TRIGL SERPL-MCNC: 80 MG/DL
TSH SERPL-ACNC: 1.67 UIU/ML
UROBILINOGEN URINE: NORMAL
WBC # FLD AUTO: 5.27 K/UL
WHITE BLOOD CELLS URINE: 1 /HPF

## 2022-03-24 RX ORDER — NITROFURANTOIN (MONOHYDRATE/MACROCRYSTALS) 25; 75 MG/1; MG/1
100 CAPSULE ORAL TWICE DAILY
Qty: 10 | Refills: 0 | Status: ACTIVE | COMMUNITY
Start: 2022-03-24 | End: 1900-01-01

## 2022-03-24 NOTE — HISTORY OF PRESENT ILLNESS
[FreeTextEntry1] : New Patient/Establish Medical Care [de-identified] : 33 y/o lady here to establish Medical care.  H/o Interstitial cystitis treated with Bladder instillation therapy.\par She has had 3 pregnancies; 1 miscarriage\par She is now on Clomid\par Has not taken COVID Vaccines\par Had COVID in December 2021\par c/o swollen  gland on right side of neck

## 2022-03-24 NOTE — HEALTH RISK ASSESSMENT
[Never] : Never [Yes] : Yes [2 - 4 times a month (2 pts)] : 2-4 times a month (2 points) [1 or 2 (0 pts)] : 1 or 2 (0 points) [Never (0 pts)] : Never (0 points) [0] : 2) Feeling down, depressed, or hopeless: Not at all (0) [PHQ-2 Negative - No further assessment needed] : PHQ-2 Negative - No further assessment needed [HIV test declined] : HIV test declined [Hepatitis C test declined] : Hepatitis C test declined [With Family] : lives with family [# of Members in Household ___] :  household currently consist of [unfilled] member(s) [Employed] : employed [Graduate School] : graduate school [Audit-CScore] : 2 [[id number]] : 0 [PapSmearDate] : 03/22 [FreeTextEntry2] :

## 2022-03-24 NOTE — REVIEW OF SYSTEMS
[Headache] : headache [Swollen Glands] : swollen glands [Dysuria] : dysuria [Fever] : no fever [Night Sweats] : no night sweats [Hoarseness] : no hoarseness [Sore Throat] : no sore throat [Postnasal Drip] : no postnasal drip [Palpitations] : no palpitations [Chest Pain] : no chest pain [Abdominal Pain] : no abdominal pain [Nausea] : no nausea [Diarrhea] : diarrhea [Vaginal Discharge] : no vaginal discharge [Joint Pain] : no joint pain [Joint Swelling] : no joint swelling [Dizziness] : no dizziness [Suicidal] : not suicidal [Depression] : no depression [Easy Bleeding] : no easy bleeding [de-identified] : 2 times a month; pain is behind eye; occurs near menstrual period [de-identified] : on right side of neck

## 2022-03-24 NOTE — PHYSICAL EXAM
[No Acute Distress] : no acute distress [Well-Appearing] : well-appearing [Normal Sclera/Conjunctiva] : normal sclera/conjunctiva [EOMI] : extraocular movements intact [Normal Outer Ear/Nose] : the outer ears and nose were normal in appearance [Normal TMs] : both tympanic membranes were normal [No JVD] : no jugular venous distention [No Respiratory Distress] : no respiratory distress  [Normal Rate] : normal rate  [Regular Rhythm] : with a regular rhythm [Normal S1, S2] : normal S1 and S2 [No Murmur] : no murmur heard [No Carotid Bruits] : no carotid bruits [No Varicosities] : no varicosities [Pedal Pulses Present] : the pedal pulses are present [No Edema] : there was no peripheral edema [Normal Appearance] : normal in appearance [No Masses] : no palpable masses [No Nipple Discharge] : no nipple discharge [No Axillary Lymphadenopathy] : no axillary lymphadenopathy [Soft] : abdomen soft [Non Tender] : non-tender [Normal Bowel Sounds] : normal bowel sounds [Normal Axillary Nodes] : no axillary lymphadenopathy [No CVA Tenderness] : no CVA  tenderness [No Spinal Tenderness] : no spinal tenderness [No Joint Swelling] : no joint swelling [Grossly Normal Strength/Tone] : grossly normal strength/tone [No Rash] : no rash [Coordination Grossly Intact] : coordination grossly intact [No Focal Deficits] : no focal deficits [Normal Gait] : normal gait [Deep Tendon Reflexes (DTR)] : deep tendon reflexes were 2+ and symmetric [Normal] : affect was normal and insight and judgment were intact [de-identified] : 5 m submental lymph node [de-identified] : 7 mm Lymph node in right lower cervical area [de-identified] : mm right lower cervical lymph node

## 2022-04-27 ENCOUNTER — APPOINTMENT (OUTPATIENT)
Dept: INTERNAL MEDICINE | Facility: CLINIC | Age: 32
End: 2022-04-27

## 2022-12-19 ENCOUNTER — HOSPITAL ENCOUNTER (INPATIENT)
Dept: HOSPITAL 74 - JLDR | Age: 32
LOS: 3 days | Discharge: HOME | End: 2022-12-22
Attending: OBSTETRICS & GYNECOLOGY | Admitting: OBSTETRICS & GYNECOLOGY
Payer: COMMERCIAL

## 2022-12-19 VITALS — RESPIRATION RATE: 18 BRPM

## 2022-12-19 VITALS — BODY MASS INDEX: 29.2 KG/M2

## 2022-12-19 DIAGNOSIS — O30.003: Primary | ICD-10-CM

## 2022-12-19 DIAGNOSIS — Z3A.36: ICD-10-CM

## 2022-12-19 DIAGNOSIS — O34.211: ICD-10-CM

## 2022-12-19 RX ADMIN — IBUPROFEN PRN MG: 600 TABLET, FILM COATED ORAL at 23:39

## 2022-12-19 RX ADMIN — IBUPROFEN PRN MG: 600 TABLET, FILM COATED ORAL at 18:09

## 2022-12-19 RX ADMIN — SIMETHICONE CHEW TAB 80 MG PRN MG: 80 TABLET ORAL at 23:38

## 2022-12-19 RX ADMIN — CLINDAMYCIN PHOSPHATE SCH MLS/HR: 600 INJECTION, SOLUTION INTRAVENOUS at 17:02

## 2022-12-20 LAB
BASOPHILS # BLD: 0.2 % (ref 0–2)
DEPRECATED RDW RBC AUTO: 13.8 % (ref 11.6–15.6)
EOSINOPHIL # BLD: 0.6 % (ref 0–4.5)
HCT VFR BLD CALC: 36.5 % (ref 32.4–45.2)
HGB BLD-MCNC: 12.3 GM/DL (ref 10.7–15.3)
LYMPHOCYTES # BLD: 9.9 % (ref 8–40)
MCH RBC QN AUTO: 32.5 PG (ref 25.7–33.7)
MCHC RBC AUTO-ENTMCNC: 33.7 G/DL (ref 32–36)
MCV RBC: 96.5 FL (ref 80–96)
MONOCYTES # BLD AUTO: 9.2 % (ref 3.8–10.2)
NEUTROPHILS # BLD: 80.1 % (ref 42.8–82.8)
PLATELET # BLD AUTO: 145 10^3/UL (ref 134–434)
PMV BLD: 9.2 FL (ref 7.5–11.1)
RBC # BLD AUTO: 3.78 M/MM3 (ref 3.6–5.2)
WBC # BLD AUTO: 11.3 K/MM3 (ref 4–10)

## 2022-12-20 RX ADMIN — IBUPROFEN PRN MG: 600 TABLET, FILM COATED ORAL at 19:49

## 2022-12-20 RX ADMIN — CLINDAMYCIN PHOSPHATE SCH MLS/HR: 600 INJECTION, SOLUTION INTRAVENOUS at 11:00

## 2022-12-20 RX ADMIN — IBUPROFEN PRN MG: 600 TABLET, FILM COATED ORAL at 11:46

## 2022-12-20 RX ADMIN — CLINDAMYCIN PHOSPHATE SCH: 600 INJECTION, SOLUTION INTRAVENOUS at 18:24

## 2022-12-20 RX ADMIN — SIMETHICONE CHEW TAB 80 MG PRN MG: 80 TABLET ORAL at 19:47

## 2022-12-20 RX ADMIN — IBUPROFEN PRN MG: 600 TABLET, FILM COATED ORAL at 07:29

## 2022-12-20 RX ADMIN — Medication SCH TAB: at 11:00

## 2022-12-20 RX ADMIN — SIMETHICONE CHEW TAB 80 MG PRN MG: 80 TABLET ORAL at 11:45

## 2022-12-20 RX ADMIN — CLINDAMYCIN PHOSPHATE SCH MLS/HR: 600 INJECTION, SOLUTION INTRAVENOUS at 01:52

## 2022-12-20 RX ADMIN — SIMETHICONE CHEW TAB 80 MG PRN MG: 80 TABLET ORAL at 07:29

## 2022-12-20 RX ADMIN — ENOXAPARIN SODIUM SCH MG: 40 INJECTION SUBCUTANEOUS at 11:00

## 2022-12-21 RX ADMIN — IBUPROFEN PRN MG: 600 TABLET, FILM COATED ORAL at 09:56

## 2022-12-21 RX ADMIN — Medication SCH TAB: at 09:56

## 2022-12-21 RX ADMIN — SIMETHICONE CHEW TAB 80 MG PRN MG: 80 TABLET ORAL at 22:06

## 2022-12-21 RX ADMIN — IBUPROFEN PRN MG: 600 TABLET, FILM COATED ORAL at 17:52

## 2022-12-21 RX ADMIN — SIMETHICONE CHEW TAB 80 MG PRN MG: 80 TABLET ORAL at 09:56

## 2022-12-21 RX ADMIN — IBUPROFEN PRN MG: 600 TABLET, FILM COATED ORAL at 22:09

## 2022-12-21 RX ADMIN — ENOXAPARIN SODIUM SCH MG: 40 INJECTION SUBCUTANEOUS at 09:56

## 2022-12-21 RX ADMIN — SIMETHICONE CHEW TAB 80 MG PRN MG: 80 TABLET ORAL at 17:52

## 2022-12-21 RX ADMIN — IBUPROFEN PRN MG: 600 TABLET, FILM COATED ORAL at 02:58

## 2022-12-22 VITALS — DIASTOLIC BLOOD PRESSURE: 75 MMHG | TEMPERATURE: 97.6 F | HEART RATE: 109 BPM | SYSTOLIC BLOOD PRESSURE: 136 MMHG

## 2022-12-22 LAB
BASOPHILS # BLD: 0.6 % (ref 0–2)
DEPRECATED RDW RBC AUTO: 13.7 % (ref 11.6–15.6)
EOSINOPHIL # BLD: 2.3 % (ref 0–4.5)
HCT VFR BLD CALC: 41.1 % (ref 32.4–45.2)
HGB BLD-MCNC: 13.8 GM/DL (ref 10.7–15.3)
LYMPHOCYTES # BLD: 17.1 % (ref 8–40)
MCH RBC QN AUTO: 32.5 PG (ref 25.7–33.7)
MCHC RBC AUTO-ENTMCNC: 33.5 G/DL (ref 32–36)
MCV RBC: 96.9 FL (ref 80–96)
MONOCYTES # BLD AUTO: 9.1 % (ref 3.8–10.2)
NEUTROPHILS # BLD: 70.9 % (ref 42.8–82.8)
PLATELET # BLD AUTO: 235 10^3/UL (ref 134–434)
PMV BLD: 8.7 FL (ref 7.5–11.1)
RBC # BLD AUTO: 4.24 M/MM3 (ref 3.6–5.2)
WBC # BLD AUTO: 10.2 K/MM3 (ref 4–10)

## 2022-12-22 RX ADMIN — IBUPROFEN PRN MG: 600 TABLET, FILM COATED ORAL at 05:25

## 2022-12-22 RX ADMIN — ENOXAPARIN SODIUM SCH MG: 40 INJECTION SUBCUTANEOUS at 09:55

## 2022-12-22 RX ADMIN — Medication SCH TAB: at 09:55

## 2024-03-10 PROBLEM — N30.10 CHRONIC INTERSTITIAL CYSTITIS: Status: ACTIVE | Noted: 2022-03-17
